# Patient Record
Sex: FEMALE | Race: WHITE | URBAN - NONMETROPOLITAN AREA
[De-identification: names, ages, dates, MRNs, and addresses within clinical notes are randomized per-mention and may not be internally consistent; named-entity substitution may affect disease eponyms.]

---

## 2020-12-04 ENCOUNTER — OFFICE VISIT (OUTPATIENT)
Dept: PRIMARY CARE CLINIC | Age: 55
End: 2020-12-04
Payer: COMMERCIAL

## 2020-12-04 VITALS
OXYGEN SATURATION: 98 % | SYSTOLIC BLOOD PRESSURE: 122 MMHG | DIASTOLIC BLOOD PRESSURE: 76 MMHG | WEIGHT: 120 LBS | BODY MASS INDEX: 20.49 KG/M2 | RESPIRATION RATE: 18 BRPM | HEIGHT: 64 IN | HEART RATE: 87 BPM | TEMPERATURE: 98.7 F

## 2020-12-04 LAB
Lab: NORMAL
QC PASS/FAIL: NORMAL
SARS-COV-2, POC: NORMAL

## 2020-12-04 PROCEDURE — 87426 SARSCOV CORONAVIRUS AG IA: CPT | Performed by: FAMILY MEDICINE

## 2020-12-04 PROCEDURE — 99202 OFFICE O/P NEW SF 15 MIN: CPT | Performed by: FAMILY MEDICINE

## 2020-12-04 NOTE — PROGRESS NOTES
12/4/2020    Chief Complaint   Patient presents with    Headache    Congestion    Cough     durning sensation in the chest area, denies CP.  Head Congestion    Generalized Body Aches       HPI    Anabella Joseph is a 54 y.o. patient that presents today with concern for COVID. Known exposure through [facility]. `    Upper Respiratory Infection/COVID Sx: Symptoms include: headache, some sinus drainage, and some tightening of the \"windpipe\" causing a cough; increased body aches, some burning in her throat. Onset of symptoms was 4-5 days ago, unchanged since that time. She is drinking plenty of fluids. Treatment to date: NSAIDs. Tylenol PRN - mostly for the hip/elbow pain. Patient's past medical, surgical, social and/or family history reviewed, updated in chart, and are non-contributory (unless otherwise stated). Medications and allergies also reviewed and updated in chart.      ROS Unless otherwise specified   Review of Systems - General ROS: negative for - chills, fatigue, fever, night sweats  Psychological ROS: negative for - anxiety, behavioral disorder, depression  ENT ROS: negative for - sinus pain  Hematological and Lymphatic ROS: negative for - bleeding problems, fatigue or swollen lymph nodes  Respiratory ROS: negative for - cough, orthopnea, shortness of breath, sputum changes, tachypnea or wheezing  Cardiovascular ROS: pt has had some chest tightness, but noted since death of her mom this past spring; no new/different symptoms; negative for - chest pain, dyspnea on exertion, irregular heartbeat, loss of consciousness, or palpitations  Gastrointestinal ROS: negative for - abdominal pain, diarrhea, gas/bloating, heartburn or nausea/vomiting  Musculoskeletal ROS: negative for - myalgias, joint pain, joint stiffness, joint swelling or back pain  Neurological ROS: negative for - behavioral changes, confusion, dizziness  Dermatological ROS: negative for - rash or skin lesion changes    Physical Exam  /76   Pulse 87   Temp 98.7 °F (37.1 °C) (Temporal)   Resp 18   Ht 5' 4\" (1.626 m)   Wt 120 lb (54.4 kg)   SpO2 98%   BMI 20.60 kg/m²     Wt Readings from Last 3 Encounters:   12/04/20 120 lb (54.4 kg)     BP Readings from Last 3 Encounters:   12/04/20 122/76         General appearance: alert, well appearing, and in no distress, oriented to person, place, and time and normal appearing weight. HEENT:  HEAD: Atraumatic, normocephalic  EARS: bilateral TM's and external ear canals generally normal; right medial TM slightly obscured by cerumen but the rest of the TM is WNL  NOSE: nasal mucosa, septum, turbinates with minimal erythema and drainage noted   MOUTH: mucous membranes moist and normal tonsils  NECK: supple, full range of motion, no mass, normal lymphadenopathy, no thyromegaly  CVS exam: normal rate, regular rhythm, normal S1, S2, no murmurs, rubs, clicks or gallops. Chest: clear to auscultation, no wheezes, rales or rhonchi, symmetric air entry. SKIN: no jaundice or pallor        Assessment/Plan  Naomi Sanabria was seen today for headache, congestion, cough, head congestion and generalized body aches. Diagnoses and all orders for this visit:    Suspected COVID-19 virus infection  -     POCT COVID-19, Antigen  -     COVID-19 Ambulatory; Future    Quarantine recommendations reviewed. Other precautions and supportive care discussed. If any worsening sx to seek care early. Advised on symptomatic relief. Tylenol or Advil for fever/pain/body aches. OTC meds prn cough/ congestion. Rest. Stay hydrated. Advised to call PCP in 3-5 days for recheck if symptoms persist. ED sooner if symptoms worsen or change. ED immediately with high or refractory fever, SOB, CP, shaking chills, vomiting, abdominal pain, etc. Pt is in agreement with this care plan. All questions answered.     Return in about 2 weeks (around 12/18/2020), or if symptoms worsen or fail to improve. Helio Hancock MD    Call or go to ED immediately if symptoms worsen or persist.    Counseled regarding above diagnosis, including possible risks and complications,  especially if left uncontrolled. Counseled regarding the possible side effects, risks, benefits and alternatives to treatment; patient and/or guardian verbalizes understanding, agrees, feels comfortable with and wishes to proceed with above treatment plan. Advised patient to call with any new medication issues, and read all Rx info from pharmacy to assure aware of all possible risks and side effects of medication before taking. Patient and/or guardian verbalizes understanding and agrees with above counseling, assessment and plan. All questions answered.

## 2020-12-05 DIAGNOSIS — Z20.822 SUSPECTED COVID-19 VIRUS INFECTION: ICD-10-CM

## 2020-12-07 LAB
SARS-COV-2: NOT DETECTED
SOURCE: NORMAL

## 2021-12-01 ENCOUNTER — OFFICE VISIT (OUTPATIENT)
Dept: FAMILY MEDICINE CLINIC | Age: 56
End: 2021-12-01
Payer: COMMERCIAL

## 2021-12-01 VITALS
HEART RATE: 76 BPM | TEMPERATURE: 98.7 F | SYSTOLIC BLOOD PRESSURE: 122 MMHG | DIASTOLIC BLOOD PRESSURE: 74 MMHG | OXYGEN SATURATION: 98 %

## 2021-12-01 DIAGNOSIS — B96.89 ACUTE BACTERIAL SINUSITIS: ICD-10-CM

## 2021-12-01 DIAGNOSIS — Z20.822 SUSPECTED COVID-19 VIRUS INFECTION: Primary | ICD-10-CM

## 2021-12-01 DIAGNOSIS — J02.9 PHARYNGITIS, UNSPECIFIED ETIOLOGY: ICD-10-CM

## 2021-12-01 DIAGNOSIS — J01.90 ACUTE BACTERIAL SINUSITIS: ICD-10-CM

## 2021-12-01 PROCEDURE — 87426 SARSCOV CORONAVIRUS AG IA: CPT | Performed by: FAMILY MEDICINE

## 2021-12-01 PROCEDURE — 99213 OFFICE O/P EST LOW 20 MIN: CPT | Performed by: FAMILY MEDICINE

## 2021-12-01 RX ORDER — METHYLPREDNISOLONE 4 MG/1
TABLET ORAL
Qty: 1 KIT | Refills: 0 | Status: SHIPPED | OUTPATIENT
Start: 2021-12-01

## 2021-12-01 RX ORDER — CIPROFLOXACIN 250 MG/1
250 TABLET, FILM COATED ORAL 2 TIMES DAILY
Qty: 14 TABLET | Refills: 0 | Status: SHIPPED | OUTPATIENT
Start: 2021-12-01 | End: 2021-12-08

## 2021-12-01 RX ORDER — LIDOCAINE HYDROCHLORIDE 20 MG/ML
10 SOLUTION OROPHARYNGEAL
Qty: 100 ML | Refills: 0 | Status: SHIPPED | OUTPATIENT
Start: 2021-12-01

## 2021-12-01 ASSESSMENT — ENCOUNTER SYMPTOMS
SINUS PRESSURE: 1
SINUS PAIN: 1
GASTROINTESTINAL NEGATIVE: 1
RHINORRHEA: 1
EYES NEGATIVE: 1
SORE THROAT: 1
RESPIRATORY NEGATIVE: 1
TROUBLE SWALLOWING: 0

## 2021-12-01 NOTE — PROGRESS NOTES
Francisco Javier Cloud (:  1965) is a 64 y.o. female,Established patient, here for evaluation of the following chief complaint(s):  Fever (started monday) and Pharyngitis         ASSESSMENT/PLAN:  1. Suspected COVID-19 virus infection  -     POCT COVID-19, Antigen  -     COVID-19 Ambulatory; Future  -     ciprofloxacin (CIPRO) 250 MG tablet; Take 1 tablet by mouth 2 times daily for 7 days, Disp-14 tablet, R-0Normal  -     methylPREDNISolone (MEDROL DOSEPACK) 4 MG tablet; Take by mouth., Disp-1 kit, R-0Normal  -     lidocaine viscous hcl (XYLOCAINE) 2 % SOLN solution; Take 10 mLs by mouth every 3 hours as needed for Irritation Gargle and spit, Disp-100 mL, R-0Normal  2. Pharyngitis, unspecified etiology  -     ciprofloxacin (CIPRO) 250 MG tablet; Take 1 tablet by mouth 2 times daily for 7 days, Disp-14 tablet, R-0Normal  -     methylPREDNISolone (MEDROL DOSEPACK) 4 MG tablet; Take by mouth., Disp-1 kit, R-0Normal  -     lidocaine viscous hcl (XYLOCAINE) 2 % SOLN solution; Take 10 mLs by mouth every 3 hours as needed for Irritation Gargle and spit, Disp-100 mL, R-0Normal  3. Acute bacterial sinusitis  -     ciprofloxacin (CIPRO) 250 MG tablet; Take 1 tablet by mouth 2 times daily for 7 days, Disp-14 tablet, R-0Normal  -     methylPREDNISolone (MEDROL DOSEPACK) 4 MG tablet; Take by mouth., Disp-1 kit, R-0Normal  -     lidocaine viscous hcl (XYLOCAINE) 2 % SOLN solution; Take 10 mLs by mouth every 3 hours as needed for Irritation Gargle and spit, Disp-100 mL, R-0Normal  At this time in office testing is negative. Advised quarantine until symptom test has returned. Red flags discussed with patient if these occur she is to go directly to the emergency department. Treat empirically at this time. No follow-ups on file. Subjective   SUBJECTIVE/OBJECTIVE:  HPI  Patient presents today for with a history of low-grade fever and worsening sore throat.   Patient states that she was at a hotel last week with her grandchildren and was exposed to chlorine which caused a burning sensation. 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 All of her family had Covid roughly 1-1/2 months ago. No loss of taste or smell. No chest pain or shortness of breath. No nausea vomiting or diarrhea. Review of Systems   Constitutional: Positive for fever. HENT: Positive for postnasal drip, rhinorrhea, sinus pressure, sinus pain and sore throat. Negative for trouble swallowing. Eyes: Negative. Respiratory: Negative. Cardiovascular: Negative. Gastrointestinal: Negative. Musculoskeletal: Negative for neck pain. Skin: Negative for rash. Neurological: Negative for headaches. Hematological: Negative for adenopathy. All other systems reviewed and are negative. Current Outpatient Medications:     ciprofloxacin (CIPRO) 250 MG tablet, Take 1 tablet by mouth 2 times daily for 7 days, Disp: 14 tablet, Rfl: 0    methylPREDNISolone (MEDROL DOSEPACK) 4 MG tablet, Take by mouth., Disp: 1 kit, Rfl: 0    lidocaine viscous hcl (XYLOCAINE) 2 % SOLN solution, Take 10 mLs by mouth every 3 hours as needed for Irritation Gargle and spit, Disp: 100 mL, Rfl: 0   There is no problem list on file for this patient.     Past Medical History:   Diagnosis Date    Uterine fibroid      Past Surgical History:   Procedure Laterality Date    ENDOMETRIAL ABLATION       Social History     Socioeconomic History    Marital status:      Spouse name: Not on file    Number of children: Not on file    Years of education: Not on file    Highest education level: Not on file   Occupational History    Not on file   Tobacco Use    Smoking status: Never Smoker    Smokeless tobacco: Not on file   Substance and Sexual Activity    Alcohol use: Yes     Comment: occsioanlly    Drug use: No    Sexual activity: Not on file   Other Topics Concern    Not on file   Social History Narrative    Not on file     Social Determinants of Health     Financial Resource Strain:     Difficulty of Paying Living Expenses: Not on file   Food Insecurity:     Worried About Running Out of Food in the Last Year: Not on file    Marlene of Food in the Last Year: Not on file   Transportation Needs:     Lack of Transportation (Medical): Not on file    Lack of Transportation (Non-Medical): Not on file   Physical Activity:     Days of Exercise per Week: Not on file    Minutes of Exercise per Session: Not on file   Stress:     Feeling of Stress : Not on file   Social Connections:     Frequency of Communication with Friends and Family: Not on file    Frequency of Social Gatherings with Friends and Family: Not on file    Attends Rastafari Services: Not on file    Active Member of [address] [place] [address] [employer] or Organizations: Not on file    Attends Club or Organization Meetings: Not on file    Marital Status: Not on file   Intimate Partner Violence:     Fear of Current or Ex-Partner: Not on file    Emotionally Abused: Not on file    Physically Abused: Not on file    Sexually Abused: Not on file   Housing Stability:     Unable to Pay for Housing in the Last Year: Not on file    Number of [place] in the Last Year: Not on file    Unstable Housing in the Last Year: Not on file     No family history on file. Health Maintenance Due   Topic Date Due    Colon cancer screen colonoscopy  Never done     There are no preventive care reminders to display for this patient.    Diabetes Management   Topic Date Due    Lipid screen  Never done      Health Maintenance Due   Topic    DTaP/Tdap/Td vaccine (1 - Tdap)    Shingles Vaccine (1 of 2)      Health Maintenance   Topic Date Due    Hepatitis C screen  Never done    COVID-19 Vaccine (1) Never done    HIV screen  Never done    DTaP/Tdap/Td vaccine (1 - Tdap) Never done    Cervical cancer screen  Never done    Lipid screen  Never done    Colon cancer screen colonoscopy  Never done    Breast cancer screen  Never done  Shingles Vaccine (1 of 2) Never done    Flu vaccine (1) Never done    Hepatitis A vaccine  Aged Out    Hepatitis B vaccine  Aged Out    Hib vaccine  Aged Out    Meningococcal (ACWY) vaccine  Aged Out    Pneumococcal 0-64 years Vaccine  Aged Out      There are no preventive care reminders to display for this patient. There are no preventive care reminders to display for this patient. /74   Pulse 76   Temp 98.7 °F (37.1 °C)   SpO2 98%     Objective   Physical Exam  Vitals reviewed. Constitutional:       Appearance: She is well-developed. She is ill-appearing. HENT:      Head: Normocephalic and atraumatic. Right Ear: External ear normal. A middle ear effusion is present. Tympanic membrane is bulging. Left Ear: External ear normal. A middle ear effusion is present. Tympanic membrane is bulging. Nose: Nose normal.      Mouth/Throat:      Pharynx: Posterior oropharyngeal erythema present. Eyes:      Conjunctiva/sclera: Conjunctivae normal.      Pupils: Pupils are equal, round, and reactive to light. Cardiovascular:      Rate and Rhythm: Normal rate and regular rhythm. Heart sounds: Normal heart sounds. Pulmonary:      Effort: Pulmonary effort is normal.      Breath sounds: Decreased breath sounds present. No wheezing. Abdominal:      General: Bowel sounds are normal.      Palpations: Abdomen is soft. Musculoskeletal:         General: Normal range of motion. Cervical back: Normal range of motion and neck supple. Skin:     General: Skin is warm and dry. Findings: No erythema. Neurological:      Mental Status: She is alert and oriented to person, place, and time. Cranial Nerves: No cranial nerve deficit. Psychiatric:         Behavior: Behavior normal.         Judgment: Judgment normal.                  An electronic signature was used to authenticate this note.     --Kiet Cronin DO

## 2021-12-02 DIAGNOSIS — Z20.822 SUSPECTED COVID-19 VIRUS INFECTION: ICD-10-CM

## 2021-12-03 LAB
SARS-COV-2: NOT DETECTED
SOURCE: NORMAL

## 2023-07-18 ENCOUNTER — TELEPHONE (OUTPATIENT)
Dept: CARDIOLOGY | Age: 58
End: 2023-07-18

## 2023-07-18 NOTE — TELEPHONE ENCOUNTER
Spoke w/ patient to confirm stress test for Thursday, 7/20/23, starting at 0700. Instructions given and medications reviewed. Patient instructed no medication holds. All questions answered.

## 2023-07-20 ENCOUNTER — HOSPITAL ENCOUNTER (OUTPATIENT)
Dept: CARDIOLOGY | Age: 58
Discharge: HOME | End: 2023-07-20

## 2023-07-20 VITALS
HEIGHT: 64 IN | SYSTOLIC BLOOD PRESSURE: 110 MMHG | DIASTOLIC BLOOD PRESSURE: 70 MMHG | BODY MASS INDEX: 22.2 KG/M2 | HEART RATE: 70 BPM | RESPIRATION RATE: 12 BRPM | WEIGHT: 130 LBS

## 2023-07-20 DIAGNOSIS — R06.02 SOB (SHORTNESS OF BREATH): Primary | ICD-10-CM

## 2023-07-20 LAB
LV EF: 81 %
LVEF MODALITY: NORMAL

## 2023-07-20 PROCEDURE — 93017 CV STRESS TEST TRACING ONLY: CPT

## 2023-07-20 PROCEDURE — 78452 HT MUSCLE IMAGE SPECT MULT: CPT

## 2023-07-20 PROCEDURE — 3430000000 HC RX DIAGNOSTIC RADIOPHARMACEUTICAL: Performed by: INTERNAL MEDICINE

## 2023-07-20 PROCEDURE — A9500 TC99M SESTAMIBI: HCPCS | Performed by: INTERNAL MEDICINE

## 2023-07-20 PROCEDURE — 2580000003 HC RX 258: Performed by: INTERNAL MEDICINE

## 2023-07-20 RX ORDER — ROSUVASTATIN CALCIUM 20 MG/1
TABLET, COATED ORAL DAILY
COMMUNITY
Start: 2023-07-14

## 2023-07-20 RX ORDER — TETRAKIS(2-METHOXYISOBUTYLISOCYANIDE)COPPER(I) TETRAFLUOROBORATE 1 MG/ML
10 INJECTION, POWDER, LYOPHILIZED, FOR SOLUTION INTRAVENOUS
Status: COMPLETED | OUTPATIENT
Start: 2023-07-20 | End: 2023-07-20

## 2023-07-20 RX ORDER — CLINDAMYCIN PHOSPHATE 10 UG/ML
LOTION TOPICAL PRN
COMMUNITY
Start: 2023-05-22

## 2023-07-20 RX ORDER — TETRAKIS(2-METHOXYISOBUTYLISOCYANIDE)COPPER(I) TETRAFLUOROBORATE 1 MG/ML
32.5 INJECTION, POWDER, LYOPHILIZED, FOR SOLUTION INTRAVENOUS
Status: COMPLETED | OUTPATIENT
Start: 2023-07-20 | End: 2023-07-20

## 2023-07-20 RX ORDER — ZOLPIDEM TARTRATE 10 MG/1
5 TABLET ORAL NIGHTLY
COMMUNITY
Start: 2023-07-13

## 2023-07-20 RX ORDER — SODIUM CHLORIDE 0.9 % (FLUSH) 0.9 %
10 SYRINGE (ML) INJECTION PRN
Status: DISCONTINUED | OUTPATIENT
Start: 2023-07-20 | End: 2023-07-21 | Stop reason: HOSPADM

## 2023-07-20 RX ADMIN — Medication 32.5 MILLICURIE: at 08:18

## 2023-07-20 RX ADMIN — SODIUM CHLORIDE, PRESERVATIVE FREE 10 ML: 5 INJECTION INTRAVENOUS at 08:19

## 2023-07-20 RX ADMIN — Medication 10 MILLICURIE: at 07:05

## 2023-07-20 RX ADMIN — SODIUM CHLORIDE, PRESERVATIVE FREE 10 ML: 5 INJECTION INTRAVENOUS at 07:06

## 2023-07-20 NOTE — PROCEDURES
after stress. FINDINGS: The overall quality of the study was good. Left ventricular cavity size was noted to be normal.    Rotational analog analysis demonstrated no patient motion or abnormal extracardiac radioactivity and soft tissue breast attenuation. The gated SPECT stress imaging in the short, vertical long, and horizontal long axis demonstrated normal homogeneous tracer distribution throughout the myocardium. The resting images show no change. Gated SPECT left ventricular ejection fraction was calculated to be 81%, with normal myocardial thickening and wall motion. Impression:    Exercise EKG was    negative. The patient experienced no chest pain with exercise. The myocardial perfusion imaging was normal.        Overall left ventricular systolic function was normal without regional wall motion abnormalities. Forman treadmill score was 7 implying low risk. Exercise capacity was above average. Low risk general exercise treadmill test.    Thank you for sending your patient to this [facility].      Electronically signed by Rosalino Arora DO on 7/20/23 at 2:49 PM EDT

## 2023-07-20 NOTE — DISCHARGE INSTRUCTIONS
[facility]      Instructions to Patients    The following are the instructions for patients who have had a procedure in our office today. Patient name: Pati Hernandez    Radionuclide Activity: 40mCi of 99mTc-Sestamibi    Date Administered: 7/20/2023    Expires: 48 hours after scheduled appointment time      Patient may resume normal activity unless otherwise instructed. Patient may resume medications as normal.  If the need should arise, patient may call [phone] between the hours of 8:00am-5:00pm.  After hours there is at least one physician on-call at all times for those patients needing assistance. Patients may call [phone] and the answering service will direct the patient to one of our physicians for assistance. After the patient's test if they are going to be leaving from an airport in the near future they should take this letter with them to verify the test and radionuclide used for their test.      This letter verifies that the above named bearer received an injection of a radionuclide for medical purpose/usage only.         Electronically signed by Alycia Mckeon on 7/20/2023 at 8:08 AM